# Patient Record
Sex: MALE | Race: WHITE
[De-identification: names, ages, dates, MRNs, and addresses within clinical notes are randomized per-mention and may not be internally consistent; named-entity substitution may affect disease eponyms.]

---

## 2022-10-31 ENCOUNTER — HOSPITAL ENCOUNTER (EMERGENCY)
Dept: HOSPITAL 26 - MED | Age: 22
Discharge: HOME | End: 2022-10-31
Payer: SELF-PAY

## 2022-10-31 VITALS — HEIGHT: 67 IN | BODY MASS INDEX: 23.11 KG/M2 | WEIGHT: 147.25 LBS

## 2022-10-31 VITALS — SYSTOLIC BLOOD PRESSURE: 134 MMHG | DIASTOLIC BLOOD PRESSURE: 70 MMHG

## 2022-10-31 DIAGNOSIS — Z88.0: ICD-10-CM

## 2022-10-31 DIAGNOSIS — M25.512: Primary | ICD-10-CM

## 2022-10-31 NOTE — NUR
Patient discharged with v/s stable. Written and verbal after care instructions 
about shoulder pain given and explained. 

Patient alert, oriented and verbalized understanding of instructions. 
Ambulatory with steady gait. All questions addressed prior to discharge. ID 
band removed. Patient advised to follow up with PMD. Rx of motrin given. 
Patient educated on indication of medication including possible reaction and 
side effects. Opportunity to ask questions provided and answered.

## 2022-11-05 ENCOUNTER — HOSPITAL ENCOUNTER (EMERGENCY)
Dept: HOSPITAL 26 - MED | Age: 22
Discharge: HOME | End: 2022-11-05
Payer: SELF-PAY

## 2022-11-05 VITALS — HEIGHT: 67 IN | BODY MASS INDEX: 22.6 KG/M2 | WEIGHT: 144 LBS

## 2022-11-05 VITALS — DIASTOLIC BLOOD PRESSURE: 71 MMHG | SYSTOLIC BLOOD PRESSURE: 120 MMHG

## 2022-11-05 DIAGNOSIS — Z79.899: ICD-10-CM

## 2022-11-05 DIAGNOSIS — M25.512: Primary | ICD-10-CM

## 2022-11-05 DIAGNOSIS — Z88.0: ICD-10-CM

## 2022-11-05 NOTE — NUR
Patient discharged with v/s stable. Written and verbal after care instructions 
ABOUT SHOULDER PAIN given and explained. 

Patient alert, oriented and verbalized understanding of instructions. Carried 
with steady gait. All questions addressed prior to discharge. ID band removed. 
Patient advised to follow up with PMD. Rx of NAPROXEN given. Patient educated 
on indication of medication including possible reaction and side effects. 
Opportunity to ask questions provided and answered.